# Patient Record
Sex: FEMALE | Race: WHITE | NOT HISPANIC OR LATINO | Employment: UNEMPLOYED | URBAN - METROPOLITAN AREA
[De-identification: names, ages, dates, MRNs, and addresses within clinical notes are randomized per-mention and may not be internally consistent; named-entity substitution may affect disease eponyms.]

---

## 2023-05-04 DIAGNOSIS — F41.9 ANXIETY: Primary | ICD-10-CM

## 2023-05-04 RX ORDER — HYDROXYZINE HYDROCHLORIDE 50 MG/1
TABLET, FILM COATED ORAL
Qty: 30 TABLET | Refills: 3 | Status: SHIPPED | OUTPATIENT
Start: 2023-05-04 | End: 2023-12-13 | Stop reason: SDUPTHER

## 2023-10-04 ENCOUNTER — HOSPITAL ENCOUNTER (OUTPATIENT)
Dept: RADIOLOGY | Facility: CLINIC | Age: 53
Discharge: HOME | End: 2023-10-04
Payer: COMMERCIAL

## 2023-10-04 DIAGNOSIS — Z12.31 ENCOUNTER FOR SCREENING MAMMOGRAM FOR MALIGNANT NEOPLASM OF BREAST: ICD-10-CM

## 2023-10-04 PROCEDURE — 77063 BREAST TOMOSYNTHESIS BI: CPT | Mod: BILATERAL PROCEDURE | Performed by: RADIOLOGY

## 2023-10-04 PROCEDURE — 77067 SCR MAMMO BI INCL CAD: CPT | Mod: 50

## 2023-10-04 PROCEDURE — 77067 SCR MAMMO BI INCL CAD: CPT | Mod: BILATERAL PROCEDURE | Performed by: RADIOLOGY

## 2023-12-13 DIAGNOSIS — F41.9 ANXIETY: ICD-10-CM

## 2023-12-13 DIAGNOSIS — Z00.00 ENCOUNTER FOR HEALTH MAINTENANCE EXAMINATION IN ADULT: Primary | ICD-10-CM

## 2023-12-13 PROBLEM — L65.9 HAIR THINNING: Status: ACTIVE | Noted: 2023-12-13

## 2023-12-13 PROBLEM — R10.84 GENERALIZED ABDOMINAL PAIN: Status: ACTIVE | Noted: 2023-12-13

## 2023-12-13 PROBLEM — L60.3 NAIL BRITTLENESS: Status: ACTIVE | Noted: 2023-12-13

## 2023-12-13 PROBLEM — A04.8 HELICOBACTER PYLORI (H. PYLORI) INFECTION: Status: ACTIVE | Noted: 2023-12-13

## 2023-12-13 PROBLEM — H10.30 ACUTE CONJUNCTIVITIS: Status: ACTIVE | Noted: 2023-12-13

## 2023-12-13 PROBLEM — R14.0 ABDOMINAL DISTENTION: Status: ACTIVE | Noted: 2023-12-13

## 2023-12-13 PROBLEM — R41.89 BRAIN FOG: Status: ACTIVE | Noted: 2023-12-13

## 2023-12-13 PROBLEM — R10.2 PELVIC PAIN IN FEMALE: Status: ACTIVE | Noted: 2023-12-13

## 2023-12-13 PROBLEM — R41.3 MEMORY LOSS: Status: ACTIVE | Noted: 2023-12-13

## 2023-12-13 PROBLEM — M25.551 HIP PAIN, BILATERAL: Status: ACTIVE | Noted: 2023-12-13

## 2023-12-13 PROBLEM — R35.0 URINARY FREQUENCY: Status: ACTIVE | Noted: 2023-12-13

## 2023-12-13 PROBLEM — R82.90 ABNORMAL URINALYSIS: Status: ACTIVE | Noted: 2023-12-13

## 2023-12-13 PROBLEM — R30.0 DYSURIA: Status: ACTIVE | Noted: 2023-12-13

## 2023-12-13 PROBLEM — L65.9 NONSCARRING HAIR LOSS, UNSPECIFIED: Status: ACTIVE | Noted: 2021-11-30

## 2023-12-13 PROBLEM — M25.552 HIP PAIN, BILATERAL: Status: ACTIVE | Noted: 2023-12-13

## 2023-12-13 PROBLEM — R53.83 FATIGUE: Status: ACTIVE | Noted: 2023-12-13

## 2023-12-13 PROBLEM — N89.8 VAGINAL ITCHING: Status: ACTIVE | Noted: 2023-12-13

## 2023-12-13 PROBLEM — K92.1 MELENA: Status: ACTIVE | Noted: 2023-12-13

## 2023-12-13 PROBLEM — L65.0 TELOGEN EFFLUVIUM: Status: ACTIVE | Noted: 2021-11-30

## 2023-12-13 PROBLEM — N95.2 VAGINAL ATROPHY: Status: ACTIVE | Noted: 2023-12-13

## 2023-12-13 PROBLEM — R51.9 HEAD ACHE: Status: ACTIVE | Noted: 2023-12-13

## 2023-12-13 PROBLEM — N39.0 ACUTE UTI: Status: ACTIVE | Noted: 2023-12-13

## 2023-12-13 PROBLEM — H92.09 EAR PAIN: Status: ACTIVE | Noted: 2023-12-13

## 2023-12-13 PROBLEM — M76.32 ILIOTIBIAL BAND SYNDROME, LEFT LEG: Status: ACTIVE | Noted: 2019-03-19

## 2023-12-13 PROBLEM — G47.00 INSOMNIA: Status: ACTIVE | Noted: 2023-12-13

## 2023-12-13 PROBLEM — M54.2 NECK PAIN: Status: ACTIVE | Noted: 2019-03-19

## 2023-12-13 RX ORDER — ESOMEPRAZOLE MAGNESIUM 40 MG/1
CAPSULE, DELAYED RELEASE ORAL
COMMUNITY
Start: 2020-06-26

## 2023-12-13 NOTE — TELEPHONE ENCOUNTER
Pt called and scheduled 2/6 physical.  Pt would like labs.  Also, Pt would like refill sent to [facility], [place]    Hydroxyzine  0  left  1 daily

## 2023-12-14 RX ORDER — HYDROXYZINE HYDROCHLORIDE 50 MG/1
TABLET, FILM COATED ORAL
Qty: 90 TABLET | Refills: 1 | Status: SHIPPED | OUTPATIENT
Start: 2023-12-14

## 2024-01-16 ENCOUNTER — OFFICE VISIT (OUTPATIENT)
Dept: PRIMARY CARE | Facility: CLINIC | Age: 54
End: 2024-01-16
Payer: COMMERCIAL

## 2024-01-16 ENCOUNTER — LAB (OUTPATIENT)
Dept: LAB | Facility: LAB | Age: 54
End: 2024-01-16
Payer: COMMERCIAL

## 2024-01-16 VITALS
HEIGHT: 64 IN | TEMPERATURE: 98.3 F | SYSTOLIC BLOOD PRESSURE: 121 MMHG | BODY MASS INDEX: 24.92 KG/M2 | WEIGHT: 146 LBS | OXYGEN SATURATION: 98 % | DIASTOLIC BLOOD PRESSURE: 75 MMHG | HEART RATE: 63 BPM

## 2024-01-16 DIAGNOSIS — K59.00 CONSTIPATION, UNSPECIFIED CONSTIPATION TYPE: ICD-10-CM

## 2024-01-16 DIAGNOSIS — Z00.00 PHYSICAL EXAM: Primary | ICD-10-CM

## 2024-01-16 DIAGNOSIS — Z00.00 ENCOUNTER FOR HEALTH MAINTENANCE EXAMINATION IN ADULT: ICD-10-CM

## 2024-01-16 LAB
ALBUMIN SERPL BCP-MCNC: 4.1 G/DL (ref 3.4–5)
ALP SERPL-CCNC: 44 U/L (ref 33–110)
ALT SERPL W P-5'-P-CCNC: 14 U/L (ref 7–45)
ANION GAP SERPL CALC-SCNC: 13 MMOL/L (ref 10–20)
AST SERPL W P-5'-P-CCNC: 16 U/L (ref 9–39)
BILIRUB SERPL-MCNC: 0.5 MG/DL (ref 0–1.2)
BUN SERPL-MCNC: 9 MG/DL (ref 6–23)
CALCIUM SERPL-MCNC: 9.4 MG/DL (ref 8.6–10.6)
CHLORIDE SERPL-SCNC: 102 MMOL/L (ref 98–107)
CHOLEST SERPL-MCNC: 262 MG/DL (ref 0–199)
CHOLESTEROL/HDL RATIO: 2.4
CO2 SERPL-SCNC: 28 MMOL/L (ref 21–32)
CREAT SERPL-MCNC: 0.76 MG/DL (ref 0.5–1.05)
EGFRCR SERPLBLD CKD-EPI 2021: >90 ML/MIN/1.73M*2
ERYTHROCYTE [DISTWIDTH] IN BLOOD BY AUTOMATED COUNT: 13 % (ref 11.5–14.5)
EST. AVERAGE GLUCOSE BLD GHB EST-MCNC: 111 MG/DL
GLUCOSE SERPL-MCNC: 87 MG/DL (ref 74–99)
HBA1C MFR BLD: 5.5 %
HCT VFR BLD AUTO: 39.5 % (ref 36–46)
HDLC SERPL-MCNC: 110.6 MG/DL
HGB BLD-MCNC: 13.2 G/DL (ref 12–16)
LDLC SERPL CALC-MCNC: 140 MG/DL
MCH RBC QN AUTO: 30.6 PG (ref 26–34)
MCHC RBC AUTO-ENTMCNC: 33.4 G/DL (ref 32–36)
MCV RBC AUTO: 92 FL (ref 80–100)
NON HDL CHOLESTEROL: 151 MG/DL (ref 0–149)
NRBC BLD-RTO: 0 /100 WBCS (ref 0–0)
PLATELET # BLD AUTO: 296 X10*3/UL (ref 150–450)
POTASSIUM SERPL-SCNC: 4.9 MMOL/L (ref 3.5–5.3)
PROT SERPL-MCNC: 6.8 G/DL (ref 6.4–8.2)
RBC # BLD AUTO: 4.31 X10*6/UL (ref 4–5.2)
SODIUM SERPL-SCNC: 138 MMOL/L (ref 136–145)
TRIGL SERPL-MCNC: 56 MG/DL (ref 0–149)
TSH SERPL-ACNC: 2.72 MIU/L (ref 0.44–3.98)
VLDL: 11 MG/DL (ref 0–40)
WBC # BLD AUTO: 6.1 X10*3/UL (ref 4.4–11.3)

## 2024-01-16 PROCEDURE — 80053 COMPREHEN METABOLIC PANEL: CPT

## 2024-01-16 PROCEDURE — 1036F TOBACCO NON-USER: CPT | Performed by: INTERNAL MEDICINE

## 2024-01-16 PROCEDURE — 99396 PREV VISIT EST AGE 40-64: CPT | Performed by: INTERNAL MEDICINE

## 2024-01-16 PROCEDURE — 36415 COLL VENOUS BLD VENIPUNCTURE: CPT

## 2024-01-16 PROCEDURE — 84443 ASSAY THYROID STIM HORMONE: CPT

## 2024-01-16 PROCEDURE — 83036 HEMOGLOBIN GLYCOSYLATED A1C: CPT

## 2024-01-16 PROCEDURE — 85027 COMPLETE CBC AUTOMATED: CPT

## 2024-01-16 PROCEDURE — 80061 LIPID PANEL: CPT

## 2024-01-16 NOTE — PROGRESS NOTES
"SUBJECTIVE:   Wendy Culp is a 53 y.o. female presenting for her annual physical/wellness.  PCP: Dany Corbett MD  Physical.  Doing well.  Had labs done today. Results pending. She was hoping that we could have gone over results during the day. I told pt I would just message her when results are back.      Colon screening:  Up to date   Last pap: Up to date   Last mammogram: Up to date   Dexa na  Vaccines declined  Diet:   healthy in general  Exercises:   regularly  Lab Results   Component Value Date    HGBA1C 5.3 11/09/2021     Lab Results   Component Value Date    CREATININE 0.85 11/09/2021     Lab Results   Component Value Date    WBC 7.6 11/09/2021    HGB 12.9 11/09/2021    HCT 40.0 11/09/2021    MCV 96 11/09/2021     11/09/2021     Lab Results   Component Value Date    CHOL 200 (H) 11/09/2021    CHOL 200 (H) 02/19/2021    CHOL 211 (H) 08/30/2019     Lab Results   Component Value Date    HDL 85.4 11/09/2021    HDL 80.9 02/19/2021    HDL 84.4 08/30/2019     No results found for: \"LDLCALC\"  Lab Results   Component Value Date    TRIG 55 11/09/2021    TRIG 57 02/19/2021    TRIG 43 08/30/2019     No components found for: \"CHOLHDL\"       ROS:  has constipation, seems to be getting worse, taking stool softener and mild laxative. No other concerns. otherwise Feeling well. No dyspnea or chest pain on exertion. No abdominal pain, change in bowel habits, black or bloody stools. No urinary tract or  symptoms.  No breast concerns. No neurological complaints.    OBJECTIVE:   The patient appears well, alert, oriented x 3, in no distress.   /75   Pulse 63   Temp 36.8 °C (98.3 °F)   Ht 1.619 m (5' 3.75\")   Wt 66.2 kg (146 lb)   SpO2 98%   BMI 25.26 kg/m²   ENT normal.  Neck supple. No adenopathy or thyromegaly. SHANNON. Lungs are clear, good air entry, no wheezes, rhonchi or rales. S1 and S2 normal, no murmurs, regular rate and rhythm. Abdomen is soft without tenderness, guarding, mass or organomegaly.  " exam deferred to Gyn. Extremities show no edema, normal peripheral pulses. Neurological is normal without focal findings.      1. Physical exam        2. Constipation, unspecified constipation type  TSH with reflex to Free T4 if abnormal      Added TSH to labs today  Follow up prn

## 2024-02-06 ENCOUNTER — APPOINTMENT (OUTPATIENT)
Dept: PRIMARY CARE | Facility: CLINIC | Age: 54
End: 2024-02-06
Payer: COMMERCIAL

## 2024-04-22 PROBLEM — K59.00 CONSTIPATION: Status: ACTIVE | Noted: 2024-04-22

## 2024-07-09 ENCOUNTER — APPOINTMENT (OUTPATIENT)
Dept: PRIMARY CARE | Facility: CLINIC | Age: 54
End: 2024-07-09
Payer: COMMERCIAL

## 2024-07-09 ENCOUNTER — LAB (OUTPATIENT)
Dept: LAB | Facility: LAB | Age: 54
End: 2024-07-09
Payer: COMMERCIAL

## 2024-07-09 VITALS
SYSTOLIC BLOOD PRESSURE: 120 MMHG | HEART RATE: 61 BPM | BODY MASS INDEX: 25.95 KG/M2 | WEIGHT: 152 LBS | HEIGHT: 64 IN | OXYGEN SATURATION: 96 % | TEMPERATURE: 98.5 F | DIASTOLIC BLOOD PRESSURE: 70 MMHG

## 2024-07-09 DIAGNOSIS — Z01.84 IMMUNITY STATUS TESTING: ICD-10-CM

## 2024-07-09 DIAGNOSIS — K59.00 CONSTIPATION, UNSPECIFIED CONSTIPATION TYPE: ICD-10-CM

## 2024-07-09 DIAGNOSIS — Z78.0 MENOPAUSE: Primary | ICD-10-CM

## 2024-07-09 DIAGNOSIS — E78.00 PURE HYPERCHOLESTEROLEMIA: ICD-10-CM

## 2024-07-09 LAB
HBV SURFACE AB SER-ACNC: >1000 MIU/ML
MEV IGG SER QL IA: POSITIVE
MUMPS IGG ANTIBODY INDEX: 6.6 IA
MUV IGG SER IA-ACNC: POSITIVE
RUBEOLA IGG ANTIBODY INDEX: 5.5 IA
RUBV IGG SERPL IA-ACNC: 6.8 IA
RUBV IGG SERPL QL IA: POSITIVE

## 2024-07-09 PROCEDURE — 86706 HEP B SURFACE ANTIBODY: CPT

## 2024-07-09 PROCEDURE — 86735 MUMPS ANTIBODY: CPT

## 2024-07-09 PROCEDURE — 86765 RUBEOLA ANTIBODY: CPT

## 2024-07-09 PROCEDURE — 99214 OFFICE O/P EST MOD 30 MIN: CPT | Performed by: INTERNAL MEDICINE

## 2024-07-09 PROCEDURE — 86317 IMMUNOASSAY INFECTIOUS AGENT: CPT

## 2024-07-09 PROCEDURE — 36415 COLL VENOUS BLD VENIPUNCTURE: CPT

## 2024-07-11 NOTE — PROGRESS NOTES
"PCP: Dany Corbett MD   I have reviewed existing histories, notes, past medical history, surgical history, social history, family history, med list, allergy list, and immunization, and updated.    HPI:   Routine Follow up. She specifically would like to discuss menopause, and wanted to review her labs. Wanted to know that her iron is not low and iodine is not low.  These were not tested but the fact that she had not anemia, and normal TSH, her iron and iodine levels were likely not low.   She experiences some menopausal symptoms such as difficulty losing weight, lower mental sharpness, fatigue, etc.  She wondered if any treatment for. We discussed hormone replacement as an option. I suggest that she does go to discuss with her gyn. She appears to be at low risk for cardiovascular complications if were on hormone replacement.   Pap and mammogram Up to date     Lab Results   Component Value Date    WBC 6.1 01/16/2024    HGB 13.2 01/16/2024    HCT 39.5 01/16/2024     01/16/2024    CHOL 262 (H) 01/16/2024    TRIG 56 01/16/2024    .6 01/16/2024    ALT 14 01/16/2024    AST 16 01/16/2024     01/16/2024    K 4.9 01/16/2024     01/16/2024    CREATININE 0.76 01/16/2024    BUN 9 01/16/2024    CO2 28 01/16/2024    TSH 2.72 01/16/2024    HGBA1C 5.5 01/16/2024     Physical exam:  /70   Pulse 61   Temp 36.9 °C (98.5 °F)   Ht 1.624 m (5' 3.95\")   Wt 68.9 kg (152 lb)   SpO2 96%   BMI 26.13 kg/m²    Neck exam showed no mass, lymphadenopathy, or thyroid enlargement, and no carotid bruit.  Heart exam showed normal heart sounds, no murmur, clicks, gallops or rubs. Regular rate and rhythm. Chest is clear; no wheezes or rales.  The abdomen is soft without tenderness, guarding, mass, rebound or organomegaly. Bowel sounds are normal. No CVA tenderness or inguinal adenopathy noted.  No leg edema.  No focal neurologic deficit      Assessments/orders:   1. Menopause        2. Constipation, unspecified " constipation type  Referral to Gastroenterology      3. Pure hypercholesterolemia  Referral to Nutrition Services      4. Immunity status testing  Mumps Antibody, IgG, Hepatitis B Surface Antibody, Rubeola Antibody, IgG, Rubella Antibody, IgG        She did also voice concern over chronic constipation, despite OTC meds. She is interested in seeing GI doctor. Referral placed. She would like to see nutritionist for high cholesterol and any dietary recommendation for hot flashes.

## 2024-09-26 ENCOUNTER — TELEPHONE (OUTPATIENT)
Dept: OBSTETRICS AND GYNECOLOGY | Facility: CLINIC | Age: 54
End: 2024-09-26
Payer: COMMERCIAL

## 2024-09-26 NOTE — TELEPHONE ENCOUNTER
Patient called asking to see Chuy Lovelace as she will be a New Patient. Patient stating for the last month and a half been experiencing continuously bleeding. Please advise

## 2024-10-09 ENCOUNTER — TELEPHONE (OUTPATIENT)
Dept: NUTRITION | Facility: HOSPITAL | Age: 54
End: 2024-10-09
Payer: COMMERCIAL

## 2024-10-09 ENCOUNTER — DOCUMENTATION (OUTPATIENT)
Dept: CARE COORDINATION | Facility: CLINIC | Age: 54
End: 2024-10-09
Payer: COMMERCIAL

## 2024-10-15 ENCOUNTER — APPOINTMENT (OUTPATIENT)
Dept: PRIMARY CARE | Facility: CLINIC | Age: 54
End: 2024-10-15
Payer: COMMERCIAL

## 2024-10-15 DIAGNOSIS — E78.00 PURE HYPERCHOLESTEROLEMIA: ICD-10-CM

## 2024-10-15 PROCEDURE — 97802 MEDICAL NUTRITION INDIV IN: CPT | Performed by: DIETITIAN, REGISTERED

## 2024-10-15 NOTE — PROGRESS NOTES
Reason for Nutrition Visit:  Pt is a 54 y.o. female being seen for initial apt at remotely referred for   1. Pure hypercholesterolemia  Referral to Nutrition Services           Medication Documentation Review Audit       Reviewed by Amando Hui MA (Medical Assistant) on 07/09/24 at 1450      Medication Order Taking? Sig Documenting Provider Last Dose Status   esomeprazole (NexIUM) 40 mg DR capsule [id number]  Take by mouth once daily. Historical Provider, MD  Active   fish oil concentrate (Omega-3) 120-180 mg capsule [id number]  Take by mouth. Historical Provider, MD  Active   hydrOXYzine HCL (Atarax) 50 mg tablet [id number]  TAKE 1 TABLET BY MOUTH AT BEDTIME AS NEEDED FOR SLEEP Dany Corbett MD  Active                     Past Medical Hx:  Patient Active Problem List   Diagnosis    Abdominal distention    Abnormal urinalysis    Acute conjunctivitis    Acute UTI    Brain fog    Dysuria    Fatigue    Generalized abdominal pain    Hair thinning    Ear pain    Head ache    Neck pain    Helicobacter pylori (H. pylori) infection    Hip pain, bilateral    Iliotibial band syndrome, left leg    Insomnia    Melena    Memory loss    Nail brittleness    Nonscarring hair loss, unspecified    Pelvic pain in female    Telogen effluvium    Urinary frequency    Vaginal atrophy    Vaginal itching    Constipation        Weight change:  Her current weight is 145-147lbs.  She would like her weight to be 136-138lbs.   Significant Weight Change: No    Lab Results   Component Value Date    HGBA1C 5.5 01/16/2024    CHOL 262 (H) 01/16/2024    LDLCALC 140 (H) 01/16/2024    TRIG 56 01/16/2024    .6 01/16/2024    LDLF 104 (H) 11/09/2021        Food and Nutrition Hx:  Reports eating 3 meals a day.  She reports eating 5-6 snacks a day, usually something sweet such as chocolate.  She grazes on snacks after lunch.  She craves sweets.     She is eating out about once a week.    She is eating fish about once a week.    Food Recall:  Breakfast:  kefir (1 cup provides 8g protein), berries  Lunch: soup or salad, porridge  Snacks: chocolate, cheese, dates and peanut butter  Dinner: salad, protein, carbohydrate-rice, quinoa, buckwheat, or potatoes  Snacks: chocolate      Beverages: water-3-8 glasses a day (drinks more on the days that she is working out); hot green tea-5 cups a day (unsweetened); alcohol-4 drinks a week    Allergies: None  Intolerance: None    GI Symptoms : increased gas, bloating, constipation, and abdominal pain Frequency: several times per week    Exercise: 3 times a week, Crossfit, 40 minutes; walking the dog, 5 times a week, 25 minutes    Sleep duration/quality : Insomnia, goes to bed around 9-11pm, wakes up around 2-3 am , gets ~4-5 hours of sleep    Supplements: Magnesium, Tumeric, and Nutrafol (for hair), Pure Encapsulations Nutrient 950   a few times a week    Cravings: Sweet  Energy Levels: Fluctuates, lower than they used to be, her energy is lower in the afternoon and evening      Estimated Nutrient Needs:    Calories for Weight Maintenance: 1733 ([place] [address] Jeor x 1.4 activity factor)  Calories for Weight Loss: 3197-1584  Protein Needs: 110g/day (0.8g/lb DBW, 136lbs)    Nutrition Diagnosis:    Diagnosis Statement 1:  Diagnosis Status: New  Diagnosis : Altered nutrition related lab values  related to  dietary and physiological factors  as evidenced by  elevated cholesterol (262mg/dl), elevated LDL cholesterol (140mg/dl)    Diagnosis Statement 2:  Diagnosis Status: New  Diagnosis : Inadequate protein intake  related to  food- and nutrition-related knowledge deficit regarding appropriate protein intake  as evidenced by  food recall showing she is not meeting the recommended 110g protein per day    Diagnosis Statement 3:   Diagnosis Status: New  Diagnosis : Inadequate fiber intake  related to food and nutrition related knowledge deficit concerning desirable quantities of fiber as evidenced by  food recall showing she is not meeting  the recommended 30g fiber per day    Nutrition Interventions:  Increased Fiber Diet and Increased Protein Diet    Nutrition Goals:  Nutrition Goals : Blood glucose control  Consistent meal/snack pattern  Consume prescribed supplements  Decrease total cholesterol  Reduce Hgb A1c  Reduce LDL level  Adequate protein intake  Adequate fiber intake    Nutrition Recommendations:  Via teach back method patient verbalized understanding of the following topics:  1) I would recommend tracking your food intake using an manuela such as My Fitness Pal or Cronometer to have a more accurate idea of if you are consistently meeting your protein and fiber goals.    2) Try to avoid grazing and stick with 3 meals and possibly 1-2 snacks a day to optimize blood sugar control.    3) To help create well balanced meals while being mindful of portion sizes, use the plate model for portioning out your meals.  Fill 1/2 of your plate with non-starchy vegetables, 1/4 of your plate with lean protein (~4-6oz per meal), and 1/4 of your plate with complex carbohydrates/whole grains.  Each meal should contain the following 3 components: protein + fiber + healthy fats.    4) Aim for 30g fiber daily.  One way to help you reach this goal is to include non-starchy vegetables with both lunch and dinner (at least 1-2 cups).  Be sure to include a wide variety of colorful vegetables throughout the week.  Also, be sure to use 100% whole wheat/whole grain breads/pastas, brown/wild rice, quinoa, oats, beans, lentils, starchy vegetables-potatoes, sweet potatoes, corn, peas, winter squash and limit/avoid white, processed carbohydrates (white bread, white pasta, white rice, etc).    5) Choose 3 out of 5 of the following daily to help you reach your daily fiber goals:  -1 cup berries (4-5g fiber)  -1/2 cup beans (7g fiber)  -1/4 cup nuts (5g fiber)  -1/3 avocado (4g fiber)  -3 cups greens (5g fiber)    6) Ensure you are getting adequate amounts of protein consistently  throughout the day.  Your daily protein goal is 110g.  Aim for 30g per meal and include 10-15g protein at snacks.    7) Options for breakfast to help you increase protein and fiber at this meal:  -homemade smoothie made with 1.5 scoops protein powder (Truvani plant protein powder) + berries + greens + source of healthy fat (wallace/flax seeds OR avocado OR natural nut butter)  -2 eggs + chicken sausage (recommended brands: Runtastic) OR low-fat cottage cheese + berries  -Greek yogurt + Caridad Crunch cereal + berries + nuts/seeds    8) Healthy snacks should be a combination of protein and high fiber complex carbohydrates.  Examples:  -fruit + nuts or natural nut butter  -whole wheat/high fiber crackers + cheese  -high quality protein bars - RX, Aloha, No Cow    9) Aim to eat at least 2-3 servings of fatty fish each week-wild caught tuna, salmon, sardines, anchovies, cod, mackerel, or herring    10) Recommended supplements:  -switch from Pure Encapsulations Nutrient 950 to Pure Encapsulations O.N.E. Multivitamin which provides 2000IUs Vitamin D3   -Nordic Naturals Pro Omega, 2 capsules per day

## 2024-10-18 PROBLEM — Z78.0 MENOPAUSE: Status: ACTIVE | Noted: 2024-10-18

## 2024-10-18 PROBLEM — Z01.419 WELL WOMAN EXAM WITH ROUTINE GYNECOLOGICAL EXAM: Status: ACTIVE | Noted: 2024-10-18

## 2024-10-19 NOTE — ASSESSMENT & PLAN NOTE
Pap is sent with HPV.  Mammogram is ordered.  Regular exercise and attaining/maintaining a healthy weight is encouraged.   Adequate calcium intake with diet or supplements is encouraged.    We will notify of any abnormal results.

## 2024-10-19 NOTE — PROGRESS NOTES
Subjective   Patient ID: Wendy Culp is a 54 y.o. female who presents for New Patient Visit (Menopause symptoms).  She presents as a new patient for gyn visit. Review of the EMR shows no recent gyn record.  Pap and HPV returned negative on 5/13/2021. She desires annual exam today.    She also desires evaluation for perimenopause concerns for symptoms and irregular bleeding.   Review of the EMR shows she was noting symptoms of menopause at her PCP visit on 7/9/2024.   Menses were monthly until last year. She went 6 months without bleeding at the start of this calendar year. In May she had a heavy bleeding episode lasting a few weeks. She started spotting one week after menses ended. She continues to have fairly light bleeding overall with heavy flow in August.  The bleeding is coming and going ever since May. This is often spotting for one week.     She also notes hot flushes, hair thinning, dry skin, difficulty sleeping and vaginal dryness. She was prescribed a vaginal estrogen cream which was helpful but she stopped taking this.   TSH returned in normal in February. She is willing to proceed with lab testing to assess menopause status. We briefly discussed management options of menopausal symptoms. These include hormone replacement, vaginal estrogen replacement, and non hormonal options to manage hot flushes. Risks and benefits of each were briefly reviewed today.         Review of Systems   Constitutional:  Negative for activity change.   HENT:  Negative for congestion.    Respiratory:  Negative for apnea and cough.    Cardiovascular:  Negative for chest pain.   Gastrointestinal:  Negative for constipation and diarrhea.   Genitourinary:  Negative for hematuria and vaginal pain.   Musculoskeletal:  Negative for joint swelling.   Neurological:  Negative for dizziness.   Psychiatric/Behavioral:  Negative for agitation.        Past Medical History:   Diagnosis Date   • Anxiety    • Arthritis    • Disease of thyroid  gland    • Eczema    • Headache    • HL (hearing loss)    • Other specified health status     No pertinent past medical history   • Peptic ulceration    • Urinary tract infection    • Visual impairment       Past Surgical History:   Procedure Laterality Date   • OTHER SURGICAL HISTORY  03/02/2020    No history of surgery      Allergies   Allergen Reactions   • Peg 3350-Electrolytes Unknown      Current Outpatient Medications on File Prior to Visit   Medication Sig Dispense Refill   • hydrOXYzine HCL (Atarax) 50 mg tablet TAKE 1 TABLET BY MOUTH AT BEDTIME AS NEEDED FOR SLEEP 90 tablet 1   • esomeprazole (NexIUM) 40 mg DR capsule Take by mouth once daily.     • fish oil concentrate (Omega-3) 120-180 mg capsule Take by mouth. (Patient not taking: Reported on 10/22/2024)       No current facility-administered medications on file prior to visit.        Objective   Physical Exam  Constitutional:       Appearance: Normal appearance.   Neck:      Thyroid: No thyromegaly.   Cardiovascular:      Rate and Rhythm: Normal rate and regular rhythm.      Heart sounds: Normal heart sounds.   Pulmonary:      Effort: Pulmonary effort is normal.      Breath sounds: Normal breath sounds.   Chest:      Chest wall: No mass.   Breasts:     Right: Normal. No inverted nipple, mass, nipple discharge or skin change.      Left: Normal. No inverted nipple, mass, nipple discharge or skin change.   Abdominal:      General: There is no distension.      Palpations: Abdomen is soft. There is no mass.      Tenderness: There is no abdominal tenderness.   Genitourinary:     General: Normal vulva.      Exam position: Lithotomy position.      Labia:         Right: No rash.         Left: No rash.       Urethra: No urethral lesion.      Vagina: Normal. No lesions.      Cervix: No friability or lesion.      Uterus: Normal. Not enlarged and not tender.       Adnexa: Right adnexa normal and left adnexa normal.        Right: No mass or tenderness.           Left: No mass or tenderness.     Musculoskeletal:         General: No deformity.      Cervical back: Neck supple.   Lymphadenopathy:      Cervical: No cervical adenopathy.   Skin:     General: Skin is warm and dry.      Findings: No rash.   Neurological:      General: No focal deficit present.      Mental Status: She is alert.   Psychiatric:         Mood and Affect: Mood normal.         Behavior: Behavior is cooperative.         Thought Content: Thought content normal.         Problem List Items Addressed This Visit       Well woman exam with routine gynecological exam - Primary    Overview     5/13/2021 pap and HPV were negative.         Current Assessment & Plan     Pap is sent with HPV.  Mammogram is ordered.  Regular exercise and attaining/maintaining a healthy weight is encouraged.   Adequate calcium intake with diet or supplements is encouraged.    We will notify of any abnormal results.          Vaginal atrophy    Overview     Mild vaginal dryness. Personal lubricant is helpful during intercourse.   She was prescribed vaginal estrogen cream by PCP which was helpful but she opted not to continue.          Menopause    Overview     Symptoms of hot flushes, vaginal dryness, insomnia, hair thinning, dry skin.   TSH returned in normal range.           Current Assessment & Plan     FSH, LH, estradiol are ordered.  Ultrasound is ordered due to irregular bleeding in perimenopause.   We briefly reviewed options of hormone replacement and non-hormonal treatment of symptoms. We will review again at follow up once labs and ultrasound are available.          Abnormal perimenopausal bleeding    Overview     After 6 months of amenorrhea, menses have returned with irregular and prolonged flow. She also has symptoms of menopause.         Current Assessment & Plan     Pelvic ultrasound and labs are ordered.  Provera 10 mg for 10 days is ordered to synchronize endometrial lining. We did review option of repeating this in the  future if menses continue and fail to normalize. This will help prevent endometrial hyperplasia.   We will consider endometrial sampling at follow up depending on appearance of endometrium on ultrasound.          Relevant Medications    medroxyPROGESTERone (Provera) 10 mg tablet    Other Relevant Orders    US PELVIS TRANSABDOMINAL WITH TRANSVAGINAL    FSH & LH    Estradiol     Other Visit Diagnoses       Encounter for screening mammogram for malignant neoplasm of breast        Relevant Orders    BI mammo bilateral screening tomosynthesis

## 2024-10-22 ENCOUNTER — APPOINTMENT (OUTPATIENT)
Dept: LAB | Facility: LAB | Age: 54
End: 2024-10-22
Payer: COMMERCIAL

## 2024-10-22 ENCOUNTER — APPOINTMENT (OUTPATIENT)
Dept: OBSTETRICS AND GYNECOLOGY | Facility: CLINIC | Age: 54
End: 2024-10-22
Payer: COMMERCIAL

## 2024-10-22 VITALS
HEIGHT: 64 IN | SYSTOLIC BLOOD PRESSURE: 102 MMHG | DIASTOLIC BLOOD PRESSURE: 72 MMHG | BODY MASS INDEX: 25.78 KG/M2 | WEIGHT: 151 LBS

## 2024-10-22 DIAGNOSIS — N95.2 VAGINAL ATROPHY: ICD-10-CM

## 2024-10-22 DIAGNOSIS — Z78.0 MENOPAUSE: ICD-10-CM

## 2024-10-22 DIAGNOSIS — N92.4 ABNORMAL PERIMENOPAUSAL BLEEDING: ICD-10-CM

## 2024-10-22 DIAGNOSIS — Z01.419 WELL WOMAN EXAM WITH ROUTINE GYNECOLOGICAL EXAM: Primary | ICD-10-CM

## 2024-10-22 DIAGNOSIS — Z12.31 ENCOUNTER FOR SCREENING MAMMOGRAM FOR MALIGNANT NEOPLASM OF BREAST: ICD-10-CM

## 2024-10-22 LAB
ESTRADIOL SERPL-MCNC: 27 PG/ML
FSH SERPL-ACNC: 126.8 IU/L
LH SERPL-ACNC: 65.7 IU/L

## 2024-10-22 PROCEDURE — 36415 COLL VENOUS BLD VENIPUNCTURE: CPT

## 2024-10-22 PROCEDURE — 99386 PREV VISIT NEW AGE 40-64: CPT | Performed by: OBSTETRICS & GYNECOLOGY

## 2024-10-22 PROCEDURE — 1036F TOBACCO NON-USER: CPT | Performed by: OBSTETRICS & GYNECOLOGY

## 2024-10-22 PROCEDURE — 87624 HPV HI-RISK TYP POOLED RSLT: CPT

## 2024-10-22 PROCEDURE — 83002 ASSAY OF GONADOTROPIN (LH): CPT

## 2024-10-22 PROCEDURE — 99204 OFFICE O/P NEW MOD 45 MIN: CPT | Performed by: OBSTETRICS & GYNECOLOGY

## 2024-10-22 PROCEDURE — 83001 ASSAY OF GONADOTROPIN (FSH): CPT

## 2024-10-22 PROCEDURE — 82670 ASSAY OF TOTAL ESTRADIOL: CPT

## 2024-10-22 PROCEDURE — 3008F BODY MASS INDEX DOCD: CPT | Performed by: OBSTETRICS & GYNECOLOGY

## 2024-10-22 RX ORDER — MEDROXYPROGESTERONE ACETATE 10 MG/1
10 TABLET ORAL DAILY
Qty: 10 TABLET | Refills: 11 | Status: SHIPPED | OUTPATIENT
Start: 2024-10-22 | End: 2025-10-22

## 2024-10-22 ASSESSMENT — ENCOUNTER SYMPTOMS
HEMATURIA: 0
DIZZINESS: 0
JOINT SWELLING: 0
APNEA: 0
AGITATION: 0
CONSTIPATION: 0
DIARRHEA: 0
ACTIVITY CHANGE: 0
COUGH: 0

## 2024-10-22 NOTE — ASSESSMENT & PLAN NOTE
FSH, LH, estradiol are ordered.  Ultrasound is ordered due to irregular bleeding in perimenopause.   We briefly reviewed options of hormone replacement and non-hormonal treatment of symptoms. We will review again at follow up once labs and ultrasound are available.

## 2024-10-22 NOTE — ASSESSMENT & PLAN NOTE
Pelvic ultrasound and labs are ordered.  Provera 10 mg for 10 days is ordered to synchronize endometrial lining. We did review option of repeating this in the future if menses continue and fail to normalize. This will help prevent endometrial hyperplasia.   We will consider endometrial sampling at follow up depending on appearance of endometrium on ultrasound.

## 2024-10-23 NOTE — PROGRESS NOTES
Subjective   Patient ID: Wendy Culp is a 54 y.o. female who presents for Follow-up (ultrasound).  10/22/2024 documentation:  She presents as a new patient for gyn visit. Review of the EMR shows no recent gyn record.  Pap and HPV returned negative on 5/13/2021. She desires annual exam today.    She also desires evaluation for perimenopause concerns for symptoms and irregular bleeding.   Review of the EMR shows she was noting symptoms of menopause at her PCP visit on 7/9/2024.   Menses were monthly until last year. She went 6 months without bleeding at the start of this calendar year. In May she had a heavy bleeding episode lasting a few weeks. She started spotting one week after menses ended. She continues to have fairly light bleeding overall with heavy flow in August.  The bleeding is coming and going ever since May. This is often spotting for one week.     She also notes hot flushes, hair thinning, dry skin, difficulty sleeping and vaginal dryness. She was prescribed a vaginal estrogen cream which was helpful but she stopped taking this.   TSH returned in normal in February. She is willing to proceed with lab testing to assess menopause status. We briefly discussed management options of menopausal symptoms. These include hormone replacement, vaginal estrogen replacement, and non hormonal options to manage hot flushes. Risks and benefits of each were briefly reviewed today.     10/30/2024:  FSH and estradiol returned in menopausal range.  Pelvic ultrasound was performed on 10/28/2024:  Report is not yet available. I reviewed the images myself. On TV imaging the uterus measures 6.4 x 4.4 x 5.4 cm and endometrium measures 3.1 mm. The left ovary has a normal appearance. The right ovary was not visualized.   She feels hot flushes are mild for now and she declines treatment with hormonal or non-hormonal medication. She would like to restart vaginal estrogen cream after discussion.   We did review risks and benefits  of hormone replacement therapy and questions were answered.           Review of Systems   Constitutional:  Negative for activity change.   HENT:  Negative for congestion.    Respiratory:  Negative for apnea and cough.    Cardiovascular:  Negative for chest pain.   Gastrointestinal:  Negative for constipation and diarrhea.   Genitourinary:  Negative for hematuria and vaginal pain.   Musculoskeletal:  Negative for joint swelling.   Neurological:  Negative for dizziness.   Psychiatric/Behavioral:  Negative for agitation.        Past Medical History:   Diagnosis Date    Anxiety     Arthritis     Disease of thyroid gland     Eczema     Headache     HL (hearing loss)     Other specified health status     No pertinent past medical history    Peptic ulceration     Urinary tract infection     Visual impairment       History reviewed. No pertinent surgical history.     Allergies   Allergen Reactions    Peg 3350-Electrolytes Unknown      Current Outpatient Medications on File Prior to Visit   Medication Sig Dispense Refill    hydrOXYzine HCL (Atarax) 50 mg tablet TAKE 1 TABLET BY MOUTH AT BEDTIME AS NEEDED FOR SLEEP 90 tablet 1    esomeprazole (NexIUM) 40 mg DR capsule Take by mouth once daily.      fish oil concentrate (Omega-3) 120-180 mg capsule Take by mouth. (Patient not taking: Reported on 10/22/2024)      medroxyPROGESTERone (Provera) 10 mg tablet Take 1 tablet (10 mg) by mouth once daily. Take 1 tablet by mouth daily for 10 days. This can be started if no spontaneous menses by cycle day 45. (Patient not taking: Reported on 10/30/2024) 10 tablet 11     No current facility-administered medications on file prior to visit.        Objective   Physical Exam  Constitutional:       Appearance: Normal appearance.   Neurological:      Mental Status: She is alert and oriented to person, place, and time.   Psychiatric:         Attention and Perception: Attention normal.         Mood and Affect: Mood and affect normal.          Speech: Speech normal.         Behavior: Behavior normal. Behavior is cooperative.           Problem List Items Addressed This Visit       Vaginal atrophy    Overview     Mild vaginal dryness. Personal lubricant is helpful during intercourse.   She was prescribed vaginal estrogen cream by PCP which was helpful but she opted not to continue.   10/30/2024 she desires to restart estrogen cream.         Relevant Medications    estradiol (Estrace) 0.01 % (0.1 mg/gram) vaginal cream    Menopause    Overview     Symptoms of hot flushes, vaginal dryness, insomnia, hair thinning, dry skin.   TSH returned in normal range.           Abnormal perimenopausal bleeding - Primary    Overview     After 6 months of amenorrhea, menses have returned with irregular and prolonged flow. She also has symptoms of menopause.  FSH and estradiol show menopause.  Endometrium shows 3.1 mm endometrial thickness.           Current Assessment & Plan     She desires to restart estrogen vaginal cream.   She opts to not start Provera given thin endometrial lining.   She declines systemic treatment of menopause.

## 2024-10-28 ENCOUNTER — HOSPITAL ENCOUNTER (OUTPATIENT)
Dept: RADIOLOGY | Facility: CLINIC | Age: 54
Discharge: HOME | End: 2024-10-28
Payer: COMMERCIAL

## 2024-10-28 VITALS — WEIGHT: 151 LBS | BODY MASS INDEX: 25.78 KG/M2 | HEIGHT: 64 IN

## 2024-10-28 DIAGNOSIS — Z12.31 ENCOUNTER FOR SCREENING MAMMOGRAM FOR MALIGNANT NEOPLASM OF BREAST: ICD-10-CM

## 2024-10-28 DIAGNOSIS — N92.4 ABNORMAL PERIMENOPAUSAL BLEEDING: ICD-10-CM

## 2024-10-28 PROCEDURE — 77067 SCR MAMMO BI INCL CAD: CPT

## 2024-10-28 PROCEDURE — 76856 US EXAM PELVIC COMPLETE: CPT

## 2024-10-28 PROCEDURE — 77063 BREAST TOMOSYNTHESIS BI: CPT | Performed by: RADIOLOGY

## 2024-10-28 PROCEDURE — 77067 SCR MAMMO BI INCL CAD: CPT | Performed by: RADIOLOGY

## 2024-10-30 ENCOUNTER — APPOINTMENT (OUTPATIENT)
Dept: OBSTETRICS AND GYNECOLOGY | Facility: CLINIC | Age: 54
End: 2024-10-30
Payer: COMMERCIAL

## 2024-10-30 VITALS — DIASTOLIC BLOOD PRESSURE: 80 MMHG | BODY MASS INDEX: 25.92 KG/M2 | WEIGHT: 151 LBS | SYSTOLIC BLOOD PRESSURE: 120 MMHG

## 2024-10-30 DIAGNOSIS — N95.2 VAGINAL ATROPHY: ICD-10-CM

## 2024-10-30 DIAGNOSIS — N92.4 ABNORMAL PERIMENOPAUSAL BLEEDING: Primary | ICD-10-CM

## 2024-10-30 DIAGNOSIS — Z78.0 MENOPAUSE: ICD-10-CM

## 2024-10-30 PROCEDURE — 1036F TOBACCO NON-USER: CPT | Performed by: OBSTETRICS & GYNECOLOGY

## 2024-10-30 PROCEDURE — 99214 OFFICE O/P EST MOD 30 MIN: CPT | Performed by: OBSTETRICS & GYNECOLOGY

## 2024-10-30 RX ORDER — ESTRADIOL 0.1 MG/G
2 CREAM VAGINAL DAILY
Qty: 42.5 G | Refills: 12 | Status: SHIPPED | OUTPATIENT
Start: 2024-10-30 | End: 2025-10-30

## 2024-10-30 ASSESSMENT — ENCOUNTER SYMPTOMS
DIARRHEA: 0
COUGH: 0
APNEA: 0
HEMATURIA: 0
JOINT SWELLING: 0
ACTIVITY CHANGE: 0
CONSTIPATION: 0
AGITATION: 0
DIZZINESS: 0

## 2024-10-30 NOTE — ASSESSMENT & PLAN NOTE
She desires to restart estrogen vaginal cream.   She opts to not start Provera given thin endometrial lining.   She declines systemic treatment of menopause.

## 2024-11-04 ENCOUNTER — TELEPHONE (OUTPATIENT)
Dept: OBSTETRICS AND GYNECOLOGY | Facility: CLINIC | Age: 54
End: 2024-11-04
Payer: COMMERCIAL

## 2024-11-04 NOTE — TELEPHONE ENCOUNTER
----- Message from Anyi Vera sent at 10/30/2024  1:06 PM EDT -----  The radiologist reading is consistent with my interpretation. We can keep plan as we discussed at the visit.

## 2024-12-03 ENCOUNTER — APPOINTMENT (OUTPATIENT)
Dept: GASTROENTEROLOGY | Facility: CLINIC | Age: 54
End: 2024-12-03
Payer: COMMERCIAL

## 2024-12-03 VITALS — BODY MASS INDEX: 25.95 KG/M2 | HEIGHT: 64 IN | HEART RATE: 70 BPM | WEIGHT: 152 LBS

## 2024-12-03 DIAGNOSIS — K59.01 SLOW TRANSIT CONSTIPATION: Primary | ICD-10-CM

## 2024-12-03 DIAGNOSIS — R14.0 ABDOMINAL BLOATING: ICD-10-CM

## 2024-12-03 PROCEDURE — 99204 OFFICE O/P NEW MOD 45 MIN: CPT | Performed by: INTERNAL MEDICINE

## 2024-12-03 PROCEDURE — 3008F BODY MASS INDEX DOCD: CPT | Performed by: INTERNAL MEDICINE

## 2024-12-03 NOTE — ASSESSMENT & PLAN NOTE
Part of her bloating is 2/2 constipation. Other d/d include visceral relaxation 2/2 hormonal changes. H.pylori is also in the diff (h/o HP s/p treatment)    2020 EGD - normal    - Rx constipation as above  - try probiotics  - I offered her repeat EGD and empiric Rx of SIBO but pt declined (see above)

## 2024-12-03 NOTE — PROGRESS NOTES
Primary Care Provider: Dany Corbett MD  Referring Provider: Dany Corbett MD  My final recommendations will be communicated back to the referring physician and/or primary care provider via shared medical records or via US mail.    Chief Complaint (Reason for visit):   Wendy Culp is a 54 y.o. female who presents for constipation    ASSESSMENT AND PLAN     Assessment & Plan  Slow transit constipation  Pt likely has slow transit constipation. Better with senna, but pt does not want to take medications. Pt states symptoms are getting worse in last one year    2020 Cscope reviewed - 10 yr screening interval    - I suspect her symptoms are worsening as she is undergoing menopause. Likely hormonal changes. I offered her a repeat Cscope to r/o mechanical obstructive lesions but patient refused  - I also advised her that she should take OTC metamucil everyday and add senna or other laxatives like Miralax everday. Pt wants to take as little medications as possible.   - I reassured her about the safety of above  medications    Orders:    Referral to Gastroenterology    Abdominal bloating  Part of her bloating is 2/2 constipation. Other d/d include visceral relaxation 2/2 hormonal changes. H.pylori is also in the diff (h/o HP s/p treatment)    2020 EGD - normal    - Rx constipation as above  - try probiotics  - I offered her repeat EGD and empiric Rx of SIBO but pt declined (see above)           I discussed the risks, benefits and alternative(s) of the procedure(s) with the patient. Pt verbalizes understanding and is willing to proceed. All questions were answered.    Jsoep Vidal MD, MS    SUBJECTIVE   HPI: History obtained from patient    55 yo comes to see me for constipation x several years, worse in last year    Pt states that she cannot move her bowel without laxative. She takes Senna as needed. She is trying ot cut down on Senna usage. She is experiencing worse constipation for last one year    Associated with  abdominal cramps and pain, which gets better after BM.     B) Bloating sometimes gets better after a good BM, sometimes doesnot  Risk factors   Pt denies s/s of Intestinal obstruction, h/o abdominal surgeries or malignancy  Pt denies motility disorder like Diabetes or Scleroderma  Pt denies denies new medications especially GLP-1 analogues, opioids etc.  Pt denies symptom association with specific foods especially lactose, daily, fructose, gluten  Pt denies s/s of malabsorption - weight loss, oily or fluffy stools (celiac disease, pancreatic insufficiency)  Other differential include - SIBO, Giardiasis, IBS, Aerophagia    REDFLAGS  Pt denies any blood in stool, black stools  Pt denies unintentional weight loss, fever, chills, night sweats  Pt denies family history of GI cancer      Past Medical History:   Diagnosis Date    Anxiety     Arthritis     Disease of thyroid gland     Eczema     Headache     HL (hearing loss)     Other specified health status     No pertinent past medical history    Peptic ulceration     Urinary tract infection     Visual impairment        No past surgical history on file.    Current Outpatient Medications   Medication Sig Dispense Refill    estradiol (Estrace) 0.01 % (0.1 mg/gram) vaginal cream Insert 0.5 Applicatorfuls (2 g) into the vagina once daily. Reduce this to twice weekly after two weeks. 42.5 g 12    hydrOXYzine HCL (Atarax) 50 mg tablet TAKE 1 TABLET BY MOUTH AT BEDTIME AS NEEDED FOR SLEEP 90 tablet 1    fish oil concentrate (Omega-3) 120-180 mg capsule Take by mouth. (Patient not taking: No sig reported)      medroxyPROGESTERone (Provera) 10 mg tablet Take 1 tablet (10 mg) by mouth once daily. Take 1 tablet by mouth daily for 10 days. This can be started if no spontaneous menses by cycle day 45. (Patient not taking: No sig reported) 10 tablet 11     No current facility-administered medications for this visit.       Allergies   Allergen Reactions    Peg 3350-Electrolytes Unknown  "    OBJECTIVE   PHYSICAL EXAM:  Pulse 70   Ht 1.626 m (5' 4\")   Wt 68.9 kg (152 lb)   BMI 26.09 kg/m²      LABS/IMAGING/SCOPES  Lab Results   Component Value Date    WBC 6.1 01/16/2024    HGB 13.2 01/16/2024    HCT 39.5 01/16/2024    MCV 92 01/16/2024     01/16/2024     Lab Results   Component Value Date    GLUCOSE 87 01/16/2024    CALCIUM 9.4 01/16/2024     01/16/2024    K 4.9 01/16/2024    CO2 28 01/16/2024     01/16/2024    BUN 9 01/16/2024    CREATININE 0.76 01/16/2024     Lab Results   Component Value Date    ALT 14 01/16/2024    AST 16 01/16/2024    ALKPHOS 44 01/16/2024    BILITOT 0.5 01/16/2024           Josep Vidal MD, MS  "

## 2024-12-03 NOTE — LETTER
December 3, 2024     Dany Corbett MD  [address]  [place] [facility] [address]    Patient: Wendy Culp   YOB: 1970   Date of Visit: 12/3/2024       Dear Dr. Dany Corbett MD:    Thank you for referring Wendy Culp to me for evaluation. Below are my notes for this consultation.  If you have questions, please do not hesitate to call me. I look forward to following your patient along with you.       Sincerely,     Josep Vidal MD, MS      CC: No Recipients  ______________________________________________________________________________________    Primary Care Provider: Dany Corbett MD  Referring Provider: Dany Corbett MD  My final recommendations will be communicated back to the referring physician and/or primary care provider via shared medical records or via US mail.    Chief Complaint (Reason for visit):   Wendy Culp is a 54 y.o. female who presents for constipation    ASSESSMENT AND PLAN     Assessment & Plan  Slow transit constipation  Pt likely has slow transit constipation. Better with senna, but pt does not want to take medications. Pt states symptoms are getting worse in last one year    2020 Cscope reviewed - 10 yr screening interval    - I suspect her symptoms are worsening as she is undergoing menopause. Likely hormonal changes. I offered her a repeat Cscope to r/o mechanical obstructive lesions but patient refused  - I also advised her that she should take OTC metamucil everyday and add senna or other laxatives like Miralax everday. Pt wants to take as little medications as possible.   - I reassured her about the safety of above  medications    Orders:  •  Referral to Gastroenterology    Abdominal bloating  Part of her bloating is 2/2 constipation. Other d/d include visceral relaxation 2/2 hormonal changes. H.pylori is also in the diff (h/o HP s/p treatment)    2020 EGD - normal    - Rx constipation as above  - try probiotics  - I offered her  repeat EGD and empiric Rx of SIBO but pt declined (see above)           I discussed the risks, benefits and alternative(s) of the procedure(s) with the patient. Pt verbalizes understanding and is willing to proceed. All questions were answered.    Josep Vidal MD, MS    SUBJECTIVE   HPI: History obtained from patient    55 yo comes to see me for constipation x several years, worse in last year    Pt states that she cannot move her bowel without laxative. She takes Senna as needed. She is trying ot cut down on Senna usage. She is experiencing worse constipation for last one year    Associated with abdominal cramps and pain, which gets better after BM.     B) Bloating sometimes gets better after a good BM, sometimes doesnot  Risk factors   Pt denies s/s of Intestinal obstruction, h/o abdominal surgeries or malignancy  Pt denies motility disorder like Diabetes or Scleroderma  Pt denies denies new medications especially GLP-1 analogues, opioids etc.  Pt denies symptom association with specific foods especially lactose, daily, fructose, gluten  Pt denies s/s of malabsorption - weight loss, oily or fluffy stools (celiac disease, pancreatic insufficiency)  Other differential include - SIBO, Giardiasis, IBS, Aerophagia    REDFLAGS  Pt denies any blood in stool, black stools  Pt denies unintentional weight loss, fever, chills, night sweats  Pt denies family history of GI cancer      Past Medical History:   Diagnosis Date   • Anxiety    • Arthritis    • Disease of thyroid gland    • Eczema    • Headache    • HL (hearing loss)    • Other specified health status     No pertinent past medical history   • Peptic ulceration    • Urinary tract infection    • Visual impairment        No past surgical history on file.    Current Outpatient Medications   Medication Sig Dispense Refill   • estradiol (Estrace) 0.01 % (0.1 mg/gram) vaginal cream Insert 0.5 Applicatorfuls (2 g) into the vagina once daily. Reduce this to twice weekly  "after two weeks. 42.5 g 12   • hydrOXYzine HCL (Atarax) 50 mg tablet TAKE 1 TABLET BY MOUTH AT BEDTIME AS NEEDED FOR SLEEP 90 tablet 1   • fish oil concentrate (Omega-3) 120-180 mg capsule Take by mouth. (Patient not taking: No sig reported)     • medroxyPROGESTERone (Provera) 10 mg tablet Take 1 tablet (10 mg) by mouth once daily. Take 1 tablet by mouth daily for 10 days. This can be started if no spontaneous menses by cycle day 45. (Patient not taking: No sig reported) 10 tablet 11     No current facility-administered medications for this visit.       Allergies   Allergen Reactions   • Peg 3350-Electrolytes Unknown     OBJECTIVE   PHYSICAL EXAM:  Pulse 70   Ht 1.626 m (5' 4\")   Wt 68.9 kg (152 lb)   BMI 26.09 kg/m²      LABS/IMAGING/SCOPES  Lab Results   Component Value Date    WBC 6.1 01/16/2024    HGB 13.2 01/16/2024    HCT 39.5 01/16/2024    MCV 92 01/16/2024     01/16/2024     Lab Results   Component Value Date    GLUCOSE 87 01/16/2024    CALCIUM 9.4 01/16/2024     01/16/2024    K 4.9 01/16/2024    CO2 28 01/16/2024     01/16/2024    BUN 9 01/16/2024    CREATININE 0.76 01/16/2024     Lab Results   Component Value Date    ALT 14 01/16/2024    AST 16 01/16/2024    ALKPHOS 44 01/16/2024    BILITOT 0.5 01/16/2024           Josep Vidal MD, MS  "

## 2024-12-03 NOTE — ASSESSMENT & PLAN NOTE
Pt likely has slow transit constipation. Better with senna, but pt does not want to take medications. Pt states symptoms are getting worse in last one year    2020 Cscope reviewed - 10 yr screening interval    - I suspect her symptoms are worsening as she is undergoing menopause. Likely hormonal changes. I offered her a repeat Cscope to r/o mechanical obstructive lesions but patient refused  - I also advised her that she should take OTC metamucil everyday and add senna or other laxatives like Miralax everday. Pt wants to take as little medications as possible.   - I reassured her about the safety of above  medications    Orders:    Referral to Gastroenterology

## 2025-02-03 ENCOUNTER — APPOINTMENT (OUTPATIENT)
Dept: PRIMARY CARE | Facility: CLINIC | Age: 55
End: 2025-02-03
Payer: COMMERCIAL

## 2025-02-03 VITALS
HEART RATE: 67 BPM | DIASTOLIC BLOOD PRESSURE: 69 MMHG | WEIGHT: 151 LBS | BODY MASS INDEX: 25.78 KG/M2 | SYSTOLIC BLOOD PRESSURE: 115 MMHG | HEIGHT: 64 IN | OXYGEN SATURATION: 98 % | TEMPERATURE: 97.9 F

## 2025-02-03 DIAGNOSIS — T70.20XA EFFECTS OF HIGH ALTITUDE, INITIAL ENCOUNTER: ICD-10-CM

## 2025-02-03 DIAGNOSIS — F41.9 ANXIETY: ICD-10-CM

## 2025-02-03 DIAGNOSIS — Z00.00 PHYSICAL EXAM: Primary | ICD-10-CM

## 2025-02-03 DIAGNOSIS — E55.9 VITAMIN D DEFICIENCY: ICD-10-CM

## 2025-02-03 PROCEDURE — 90471 IMMUNIZATION ADMIN: CPT | Performed by: INTERNAL MEDICINE

## 2025-02-03 PROCEDURE — 99396 PREV VISIT EST AGE 40-64: CPT | Performed by: INTERNAL MEDICINE

## 2025-02-03 PROCEDURE — 1036F TOBACCO NON-USER: CPT | Performed by: INTERNAL MEDICINE

## 2025-02-03 PROCEDURE — 90656 IIV3 VACC NO PRSV 0.5 ML IM: CPT | Performed by: INTERNAL MEDICINE

## 2025-02-03 PROCEDURE — 99213 OFFICE O/P EST LOW 20 MIN: CPT | Performed by: INTERNAL MEDICINE

## 2025-02-03 PROCEDURE — 3008F BODY MASS INDEX DOCD: CPT | Performed by: INTERNAL MEDICINE

## 2025-02-03 RX ORDER — ACETAZOLAMIDE 125 MG/1
125 TABLET ORAL 2 TIMES DAILY
Qty: 30 TABLET | Refills: 0 | Status: SHIPPED | OUTPATIENT
Start: 2025-02-03 | End: 2025-02-18

## 2025-02-03 RX ORDER — HYDROXYZINE HYDROCHLORIDE 50 MG/1
TABLET, FILM COATED ORAL
Qty: 90 TABLET | Refills: 1 | Status: SHIPPED | OUTPATIENT
Start: 2025-02-03

## 2025-02-03 ASSESSMENT — PATIENT HEALTH QUESTIONNAIRE - PHQ9
7. TROUBLE CONCENTRATING ON THINGS, SUCH AS READING THE NEWSPAPER OR WATCHING TELEVISION: SEVERAL DAYS
6. FEELING BAD ABOUT YOURSELF - OR THAT YOU ARE A FAILURE OR HAVE LET YOURSELF OR YOUR FAMILY DOWN: SEVERAL DAYS
1. LITTLE INTEREST OR PLEASURE IN DOING THINGS: SEVERAL DAYS
2. FEELING DOWN, DEPRESSED OR HOPELESS: SEVERAL DAYS
5. POOR APPETITE OR OVEREATING: NOT AT ALL
9. THOUGHTS THAT YOU WOULD BE BETTER OFF DEAD, OR OF HURTING YOURSELF: NOT AT ALL
3. TROUBLE FALLING OR STAYING ASLEEP OR SLEEPING TOO MUCH: SEVERAL DAYS
4. FEELING TIRED OR HAVING LITTLE ENERGY: SEVERAL DAYS
8. MOVING OR SPEAKING SO SLOWLY THAT OTHER PEOPLE COULD HAVE NOTICED. OR THE OPPOSITE, BEING SO FIGETY OR RESTLESS THAT YOU HAVE BEEN MOVING AROUND A LOT MORE THAN USUAL: NOT AT ALL
SUM OF ALL RESPONSES TO PHQ QUESTIONS 1-9: 6
SUM OF ALL RESPONSES TO PHQ9 QUESTIONS 1 AND 2: 2

## 2025-02-03 ASSESSMENT — ANXIETY QUESTIONNAIRES
1. FEELING NERVOUS, ANXIOUS, OR ON EDGE: MORE THAN HALF THE DAYS
4. TROUBLE RELAXING: SEVERAL DAYS
IF YOU CHECKED OFF ANY PROBLEMS ON THIS QUESTIONNAIRE, HOW DIFFICULT HAVE THESE PROBLEMS MADE IT FOR YOU TO DO YOUR WORK, TAKE CARE OF THINGS AT HOME, OR GET ALONG WITH OTHER PEOPLE: SOMEWHAT DIFFICULT
7. FEELING AFRAID AS IF SOMETHING AWFUL MIGHT HAPPEN: MORE THAN HALF THE DAYS
3. WORRYING TOO MUCH ABOUT DIFFERENT THINGS: MORE THAN HALF THE DAYS
6. BECOMING EASILY ANNOYED OR IRRITABLE: SEVERAL DAYS
5. BEING SO RESTLESS THAT IT IS HARD TO SIT STILL: NOT AT ALL
2. NOT BEING ABLE TO STOP OR CONTROL WORRYING: MORE THAN HALF THE DAYS
GAD7 TOTAL SCORE: 10

## 2025-02-03 NOTE — PROGRESS NOTES
"Dany Corbett MD  SUBJECTIVE:     Wendy Culp is a 54 y.o. female presenting for her annual physical/wellness.    She is concerned with her weight, gradually gaining weight over the years.   She likes eating sweet, otherwise meals are healthy. She is active physically, but said she could walk more.    She has migraine, lasting couple of hours when it occurs, about once a week or so.    Colon screening:  Up to date   Last pap: Up to date   Last mammogram: Up to date   Dexa  na  Vaccines will get flu vaccine today    Diet:   healthy in general  Exercises:  regularly  Lab Results   Component Value Date    HGBA1C 5.5 01/16/2024     Lab Results   Component Value Date    CREATININE 0.76 01/16/2024     Lab Results   Component Value Date    WBC 6.1 01/16/2024    HGB 13.2 01/16/2024    HCT 39.5 01/16/2024    MCV 92 01/16/2024     01/16/2024     Lab Results   Component Value Date    CHOL 262 (H) 01/16/2024    CHOL 200 (H) 11/09/2021    CHOL 200 (H) 02/19/2021     Lab Results   Component Value Date    .6 01/16/2024    HDL 85.4 11/09/2021    HDL 80.9 02/19/2021     Lab Results   Component Value Date    LDLCALC 140 (H) 01/16/2024     Lab Results   Component Value Date    TRIG 56 01/16/2024    TRIG 55 11/09/2021    TRIG 57 02/19/2021     No components found for: \"CHOLHDL\"       ROS:   Feeling well. No dyspnea or chest pain on exertion. No abdominal pain, change in bowel habits, black or bloody stools. No urinary tract or  symptoms.  No breast concerns. No neurological complaints.    OBJECTIVE:   The patient appears well, alert, oriented x 3, in no distress.   /69   Pulse 67   Temp 36.6 °C (97.9 °F)   Ht 1.613 m (5' 3.5\")   Wt 68.5 kg (151 lb)   SpO2 98%   BMI 26.33 kg/m²   ENT normal.  Neck supple. No adenopathy or thyromegaly. SHANNON. Lungs are clear, good air entry, no wheezes, rhonchi or rales. S1 and S2 normal, no murmurs, regular rate and rhythm. Abdomen is soft without tenderness, guarding, mass " or organomegaly.  exam deferred to Gyn. Extremities show no edema, normal peripheral pulses. Neurological is normal without focal findings.    Assessment & Plan  Physical exam    Orders:    Hemoglobin A1C; Future    Lipid Panel; Future    Comprehensive Metabolic Panel; Future    CBC; Future    Anxiety    Orders:    hydrOXYzine HCL (Atarax) 50 mg tablet; TAKE 1 TABLET BY MOUTH AT BEDTIME AS NEEDED FOR SLEEP    Effects of high altitude, initial encounter  She is to travel to [place] in March  Orders:    acetaZOLAMIDE (Diamox) 125 mg tablet; Take 1 tablet (125 mg) by mouth 2 times a day for 15 days.    Vitamin D deficiency    Orders:    Vitamin D 25-Hydroxy,Total (for eval of Vitamin D levels); Future     I reviewed [facility] travel advice for [place], recommended covid booster, and Hep A.  She is immune to hep b and MMR per lab testing  She said she is not going to [facility] for malaria prophylaxis. She does not think she needs typhoid vaccine.

## 2025-05-19 ENCOUNTER — APPOINTMENT (OUTPATIENT)
Dept: OPHTHALMOLOGY | Facility: CLINIC | Age: 55
End: 2025-05-19
Payer: COMMERCIAL

## 2025-05-19 DIAGNOSIS — H04.129 DRY EYE: ICD-10-CM

## 2025-05-19 DIAGNOSIS — H57.13 EYE PAIN, BILATERAL: Primary | ICD-10-CM

## 2025-05-19 PROCEDURE — 99203 OFFICE O/P NEW LOW 30 MIN: CPT | Performed by: OPHTHALMOLOGY

## 2025-05-19 ASSESSMENT — REFRACTION_WEARINGRX
OD_HBASE: IN
OD_AXIS: 090
OS_SPHERE: +2.50
OD_HPRISM: .75
OD_SPHERE: +2.00
OS_HPRISM: .75
OD_HBASE: IN
OD_SPHERE: +0.75
OD_CYLINDER: -0.25
OD_HPRISM: .75
OS_CYLINDER: -0.50
OS_HPRISM: .75
OD_CYLINDER: -0.25
OS_HBASE: IN
OS_SPHERE: +1.25
OD_AXIS: 090
OS_CYLINDER: -0.50
OS_AXIS: 090
OS_AXIS: 090
OS_HBASE: IN

## 2025-05-19 ASSESSMENT — CONF VISUAL FIELD
OS_SUPERIOR_NASAL_RESTRICTION: 0
OS_SUPERIOR_TEMPORAL_RESTRICTION: 0
OS_INFERIOR_NASAL_RESTRICTION: 0
OD_SUPERIOR_NASAL_RESTRICTION: 0
OD_INFERIOR_TEMPORAL_RESTRICTION: 0
OS_INFERIOR_TEMPORAL_RESTRICTION: 0
OD_INFERIOR_NASAL_RESTRICTION: 0
OS_NORMAL: 1
OD_SUPERIOR_TEMPORAL_RESTRICTION: 0
OD_NORMAL: 1

## 2025-05-19 ASSESSMENT — ENCOUNTER SYMPTOMS
PSYCHIATRIC NEGATIVE: 0
CARDIOVASCULAR NEGATIVE: 0
NEUROLOGICAL NEGATIVE: 0
CONSTITUTIONAL NEGATIVE: 0
HEMATOLOGIC/LYMPHATIC NEGATIVE: 0
GASTROINTESTINAL NEGATIVE: 0
MUSCULOSKELETAL NEGATIVE: 0
EYES NEGATIVE: 1
ALLERGIC/IMMUNOLOGIC NEGATIVE: 0
RESPIRATORY NEGATIVE: 0
ENDOCRINE NEGATIVE: 0

## 2025-05-19 ASSESSMENT — TONOMETRY
IOP_METHOD: GOLDMANN APPLANATION
OD_IOP_MMHG: 16
OS_IOP_MMHG: 16

## 2025-05-19 ASSESSMENT — EXTERNAL EXAM - RIGHT EYE: OD_EXAM: NORMAL

## 2025-05-19 ASSESSMENT — SLIT LAMP EXAM - LIDS
COMMENTS: NORMAL
COMMENTS: NORMAL

## 2025-05-19 ASSESSMENT — EXTERNAL EXAM - LEFT EYE: OS_EXAM: NORMAL

## 2025-05-19 ASSESSMENT — CUP TO DISC RATIO
OS_RATIO: 0.2
OD_RATIO: 0.2

## 2025-05-19 ASSESSMENT — VISUAL ACUITY
OD_SC: 20/25
OS_SC: 20/20
METHOD: SNELLEN - LINEAR

## 2025-11-05 ENCOUNTER — APPOINTMENT (OUTPATIENT)
Dept: OBSTETRICS AND GYNECOLOGY | Facility: CLINIC | Age: 55
End: 2025-11-05
Payer: COMMERCIAL

## 2026-02-04 ENCOUNTER — APPOINTMENT (OUTPATIENT)
Dept: PRIMARY CARE | Facility: CLINIC | Age: 56
End: 2026-02-04
Payer: COMMERCIAL

## 2026-02-18 ENCOUNTER — APPOINTMENT (OUTPATIENT)
Dept: OBSTETRICS AND GYNECOLOGY | Facility: CLINIC | Age: 56
End: 2026-02-18
Payer: COMMERCIAL

## 2026-03-04 ENCOUNTER — APPOINTMENT (OUTPATIENT)
Dept: OBSTETRICS AND GYNECOLOGY | Facility: CLINIC | Age: 56
End: 2026-03-04
Payer: COMMERCIAL